# Patient Record
Sex: FEMALE | URBAN - METROPOLITAN AREA
[De-identification: names, ages, dates, MRNs, and addresses within clinical notes are randomized per-mention and may not be internally consistent; named-entity substitution may affect disease eponyms.]

---

## 2018-02-19 ENCOUNTER — EMERGENCY (EMERGENCY)
Facility: HOSPITAL | Age: 78
LOS: 1 days | Discharge: HOME | End: 2018-02-19
Admitting: EMERGENCY MEDICINE
Payer: MEDICARE

## 2018-02-19 VITALS
TEMPERATURE: 98 F | SYSTOLIC BLOOD PRESSURE: 127 MMHG | OXYGEN SATURATION: 97 % | WEIGHT: 143.96 LBS | HEIGHT: 65 IN | HEART RATE: 81 BPM | DIASTOLIC BLOOD PRESSURE: 74 MMHG | RESPIRATION RATE: 18 BRPM

## 2018-02-19 PROCEDURE — 99282 EMERGENCY DEPT VISIT SF MDM: CPT

## 2018-02-19 NOTE — ED PROVIDER NOTE - OBJECTIVE STATEMENT
The pt is a 76 y/o F, who presents to ED stating that she saw blood from r nipple once today. Just had a normal mammogram and normal check up w/pmd. No further bleeding The pt is a 76 y/o F, who presents to ED stating that she saw blood from r nipple once today. Just had a normal mammogram 2 mon ago and normal check up w/pmd, has another f/u appointment in 2 d. No further bleeding, no fevers, no chills, no cp, no sob

## 2018-02-19 NOTE — ED ADULT NURSE NOTE - OBJECTIVE STATEMENT
language line used,  Rishabh [id number]. patient states that this morning she noticed some discharge on her dress. patient states that from her right nipple there was brown discharge, had a normal mammogram in January of 2018 and had a routine doctors appointment on Feb 15th. denies fever, chills. no discharge noted at this time

## 2018-02-19 NOTE — ED PROVIDER NOTE - GENITOURINARY, MLM
R Breast: no swelling, no discharge, no nipple retractions, + fibrocystic tissue to upper quad, no axillary lymphadenopathy

## 2018-02-19 NOTE — ED ADULT TRIAGE NOTE - CHIEF COMPLAINT QUOTE
Pt CO Nipple Discharge since yesterday.  Pt states "I noticed a brownish stain on my dress and I'm not sure if my nipples are bleeding."  Pt denies recent falls, trauma, Dizziness, N/V/D, SOB, Fevers and CP

## 2018-02-19 NOTE — ED PROVIDER NOTE - MEDICAL DECISION MAKING DETAILS
pt states that saw blood on bra and convinced that it's from her r nipple - 1 episode, no bleeding at this time, no nipple dc on exam, just had normal mammogram 2 mon ago - advised to f/u w/her gyn for eval and further w/u

## 2018-02-23 DIAGNOSIS — Z00.8 ENCOUNTER FOR OTHER GENERAL EXAMINATION: ICD-10-CM

## 2018-02-23 DIAGNOSIS — I10 ESSENTIAL (PRIMARY) HYPERTENSION: ICD-10-CM

## 2018-02-23 DIAGNOSIS — E11.9 TYPE 2 DIABETES MELLITUS WITHOUT COMPLICATIONS: ICD-10-CM
